# Patient Record
Sex: MALE | Race: WHITE | NOT HISPANIC OR LATINO | Employment: FULL TIME | URBAN - METROPOLITAN AREA
[De-identification: names, ages, dates, MRNs, and addresses within clinical notes are randomized per-mention and may not be internally consistent; named-entity substitution may affect disease eponyms.]

---

## 2021-07-26 ENCOUNTER — APPOINTMENT (EMERGENCY)
Dept: RADIOLOGY | Facility: HOSPITAL | Age: 21
End: 2021-07-26

## 2021-07-26 ENCOUNTER — HOSPITAL ENCOUNTER (EMERGENCY)
Facility: HOSPITAL | Age: 21
Discharge: HOME | End: 2021-07-26
Attending: EMERGENCY MEDICINE | Admitting: EMERGENCY MEDICINE

## 2021-07-26 VITALS
DIASTOLIC BLOOD PRESSURE: 76 MMHG | HEART RATE: 74 BPM | RESPIRATION RATE: 18 BRPM | OXYGEN SATURATION: 100 % | SYSTOLIC BLOOD PRESSURE: 132 MMHG | TEMPERATURE: 98.8 F

## 2021-07-26 DIAGNOSIS — S61.312A LACERATION OF RIGHT MIDDLE FINGER WITHOUT FOREIGN BODY WITH DAMAGE TO NAIL, INITIAL ENCOUNTER: Primary | ICD-10-CM

## 2021-07-26 PROCEDURE — 99284 EMERGENCY DEPT VISIT MOD MDM: CPT | Performed by: EMERGENCY MEDICINE

## 2021-07-26 PROCEDURE — 90471 IMMUNIZATION ADMIN: CPT

## 2021-07-26 PROCEDURE — 73140 X-RAY EXAM OF FINGER(S): CPT

## 2021-07-26 PROCEDURE — 99283 EMERGENCY DEPT VISIT LOW MDM: CPT

## 2021-07-26 PROCEDURE — 90715 TDAP VACCINE 7 YRS/> IM: CPT | Performed by: EMERGENCY MEDICINE

## 2021-07-26 PROCEDURE — 96365 THER/PROPH/DIAG IV INF INIT: CPT

## 2021-07-26 RX ORDER — CEFAZOLIN SODIUM 2 G/50ML
2000 SOLUTION INTRAVENOUS ONCE
Status: COMPLETED | OUTPATIENT
Start: 2021-07-26 | End: 2021-07-26

## 2021-07-26 RX ORDER — LIDOCAINE HYDROCHLORIDE 10 MG/ML
5 INJECTION, SOLUTION EPIDURAL; INFILTRATION; INTRACAUDAL; PERINEURAL ONCE
Status: COMPLETED | OUTPATIENT
Start: 2021-07-26 | End: 2021-07-26

## 2021-07-26 RX ORDER — OXYCODONE HYDROCHLORIDE AND ACETAMINOPHEN 5; 325 MG/1; MG/1
2 TABLET ORAL ONCE
Status: COMPLETED | OUTPATIENT
Start: 2021-07-26 | End: 2021-07-26

## 2021-07-26 RX ADMIN — OXYCODONE HYDROCHLORIDE AND ACETAMINOPHEN 2 TABLET: 5; 325 TABLET ORAL at 21:32

## 2021-07-26 RX ADMIN — TETANUS TOXOID, REDUCED DIPHTHERIA TOXOID AND ACELLULAR PERTUSSIS VACCINE, ADSORBED 0.5 ML: 5; 2.5; 8; 8; 2.5 SUSPENSION INTRAMUSCULAR at 21:33

## 2021-07-26 RX ADMIN — LIDOCAINE HYDROCHLORIDE 5 ML: 10 INJECTION, SOLUTION EPIDURAL; INFILTRATION; INTRACAUDAL at 21:06

## 2021-07-26 RX ADMIN — CEFAZOLIN SODIUM 2000 MG: 2 SOLUTION INTRAVENOUS at 21:08

## 2021-07-27 ENCOUNTER — TELEPHONE (OUTPATIENT)
Dept: OBGYN CLINIC | Facility: HOSPITAL | Age: 21
End: 2021-07-27

## 2021-07-27 ENCOUNTER — OFFICE VISIT (OUTPATIENT)
Dept: OBGYN CLINIC | Facility: CLINIC | Age: 21
End: 2021-07-27

## 2021-07-27 VITALS — BODY MASS INDEX: 21.22 KG/M2 | WEIGHT: 140 LBS | HEIGHT: 68 IN

## 2021-07-27 DIAGNOSIS — S61.209A FINGERTIP AVULSION, INITIAL ENCOUNTER: Primary | ICD-10-CM

## 2021-07-27 PROCEDURE — 99203 OFFICE O/P NEW LOW 30 MIN: CPT | Performed by: ORTHOPAEDIC SURGERY

## 2021-07-27 RX ORDER — CEPHALEXIN 500 MG/1
500 CAPSULE ORAL EVERY 6 HOURS SCHEDULED
Qty: 28 CAPSULE | Refills: 0 | Status: SHIPPED | OUTPATIENT
Start: 2021-07-27 | End: 2021-08-03

## 2021-07-27 RX ORDER — OXYCODONE HYDROCHLORIDE 5 MG/1
5 TABLET ORAL EVERY 4 HOURS PRN
Qty: 20 TABLET | Refills: 0 | Status: SHIPPED | OUTPATIENT
Start: 2021-07-27

## 2021-07-27 NOTE — TELEPHONE ENCOUNTER
Patient called because he was in the ED last night with a laceration of right middle finger  He was told by Dr Rae Jones from ED, that he needs to be seen today early in the morning no later than 8:30 AM since he only has a gauze and wound is open  I was able to schedule an appointment for 10:00 AM today but he does not want to wait until 10  Please advise    CB # [phone]

## 2021-07-27 NOTE — PROGRESS NOTES
Assessment/Plan:  1  Fingertip avulsion, initial encounter       Katie Szymanski is a pleasant 19year old who presents to the office today for an  initial evaluation of his right long finger laceration, date of injury 7/26/2021  Upon evaluation and review of his x-ray's, he has an oblique tissue avulsion of the ulnar aspect of his right long finger with a small amount of exposed bone  The small exposed bone was shortened in the office today without any complications  FDP and ext were intact post shortening  The wound was thoroughly cleansed with NSS> Sutures were placed in the office today without any complications  A sterile dressing was applied to the finger at today's visit  I discussed with the patient that the finger should heal over with good wound care  I discussed with the patient that because of the oblique avulsion, he might need the finger shortened in the future  A prescription for Keflex and Oxycodone was sent into the patient's pharmacy  I discussed with the patient that he should follow-up with orthopedics once he returns to [facility] for further evaluation  He understood and had no further questions  Subjective:   Patient ID: Ritu Mcgee is a 19 y o  male who presents to the office today for an initial evaluation of his right long finger laceration  He is currently visiting from [facility] and plans on returning to [facility] on 7/28/2021  He states that on 7/26/2021, he was mowing the grass and states that the grass got caught and he tried to unclog the mower when his finger was struck by the blade of the mower  He was seen in the ED on 7/26/2021 where x-ray's were taken and he was placed on an IV antibiotic  He has been taking per for the pain  He states that he is experiencing a throbbing pain about his right long finger  He denies any previous injuries  Review of Systems   Constitutional: Negative for chills, fever and unexpected weight change     HENT: Negative for hearing loss, nosebleeds and sore throat  Eyes: Negative for pain, redness and visual disturbance  Respiratory: Negative for cough, shortness of breath and wheezing  Cardiovascular: Negative for chest pain, palpitations and leg swelling  Gastrointestinal: Negative for abdominal pain, nausea and vomiting  Endocrine: Negative for polyphagia and polyuria  Genitourinary: Negative for dysuria and hematuria  Musculoskeletal: Negative for arthralgias, gait problem and joint swelling  Skin: Negative for rash and wound  Neurological: Negative for dizziness, numbness and headaches  Psychiatric/Behavioral: Negative for decreased concentration  The patient is not nervous/anxious  History reviewed  No pertinent past medical history  History reviewed  No pertinent surgical history  History reviewed  No pertinent family history  Social History     Occupational History    Not on file   Tobacco Use    Smoking status: Never Smoker    Smokeless tobacco: Never Used   Substance and Sexual Activity    Alcohol use: Never    Drug use: Never    Sexual activity: Not on file       No current outpatient medications on file  No current facility-administered medications for this visit  No Known Allergies    Objective: There were no vitals filed for this visit  Ortho Exam   Right Long Finger  Oblique tissue avulsion of ulnar aspect  Small exposed bone  FDS and FDP extensors intact  Compartments soft  Brisk capillary refill  S/m intact median, radial, and ulnar nerve     Physical Exam  Vitals reviewed  Constitutional:       Appearance: He is well-developed  HENT:      Head: Normocephalic and atraumatic  Eyes:      Conjunctiva/sclera: Conjunctivae normal       Pupils: Pupils are equal, round, and reactive to light  Cardiovascular:      Rate and Rhythm: Normal rate  Pulmonary:      Effort: Pulmonary effort is normal  No respiratory distress  Musculoskeletal:      Cervical back: Normal range of motion and neck supple  Comments: As noted in HPI   Skin:     General: Skin is warm and dry  Neurological:      Mental Status: He is alert and oriented to person, place, and time  Psychiatric:         Behavior: Behavior normal          I have personally reviewed pertinent films in PACS and my interpretation is as follows: X-ray's performed on 7/26/2021 of his right hand demonstrates an oblique tissue avulsion of the ulnar aspect of his right long finger

## 2021-07-27 NOTE — ED PROVIDER NOTES
History  Chief Complaint   Patient presents with    Finger Laceration     Cutting the grass this afternoon and the  cut the right middle finger     Patient is a 68-year-old male that was cutting the grass with long more in the grass got caught in the more so he put his hand into trouble the grass out and the  blade spun around amputating the distal portion of his middle phalanx on his right hand  No other injuries          None       History reviewed  No pertinent past medical history  History reviewed  No pertinent surgical history  History reviewed  No pertinent family history  I have reviewed and agree with the history as documented  E-Cigarette/Vaping     E-Cigarette/Vaping Substances     Social History     Tobacco Use    Smoking status: Never Smoker    Smokeless tobacco: Never Used   Substance Use Topics    Alcohol use: Never    Drug use: Never       Review of Systems   Constitutional: Negative for activity change, chills, diaphoresis and fever  HENT: Negative for congestion, ear pain, nosebleeds, sore throat, trouble swallowing and voice change  Eyes: Negative for pain, discharge and redness  Respiratory: Negative for apnea, cough, choking, shortness of breath, wheezing and stridor  Cardiovascular: Negative for chest pain and palpitations  Gastrointestinal: Negative for abdominal distention, abdominal pain, constipation, diarrhea, nausea and vomiting  Endocrine: Negative for polydipsia  Genitourinary: Negative for difficulty urinating, dysuria, flank pain, frequency, hematuria and urgency  Musculoskeletal: Negative for back pain, gait problem, joint swelling, myalgias, neck pain and neck stiffness  Skin: Negative for pallor and rash  Neurological: Negative for dizziness, tremors, syncope, speech difficulty, weakness, numbness and headaches  Hematological: Negative for adenopathy     Psychiatric/Behavioral: Negative for confusion, hallucinations, self-injury and suicidal ideas  The patient is not nervous/anxious  Physical Exam  Physical Exam  Vitals and nursing note reviewed  Constitutional:       General: He is not in acute distress  Appearance: He is well-developed  He is not diaphoretic  HENT:      Head: Normocephalic and atraumatic  Right Ear: External ear normal       Left Ear: External ear normal       Nose: Nose normal    Eyes:      Conjunctiva/sclera: Conjunctivae normal       Pupils: Pupils are equal, round, and reactive to light  Cardiovascular:      Rate and Rhythm: Normal rate and regular rhythm  Heart sounds: Normal heart sounds  Pulmonary:      Effort: Pulmonary effort is normal       Breath sounds: Normal breath sounds  Abdominal:      General: Bowel sounds are normal       Palpations: Abdomen is soft  Musculoskeletal:         General: Normal range of motion  Cervical back: Normal range of motion and neck supple  Comments: Patient has lost the distal tip of the right middle finger angulated across the nail which is completely missing  Patient does also have bone exposed and fracture of the distal tip  Skin:     General: Skin is warm and dry  Neurological:      Mental Status: He is alert and oriented to person, place, and time  Deep Tendon Reflexes: Reflexes are normal and symmetric  Psychiatric:         Behavior: Behavior is cooperative           Vital Signs  ED Triage Vitals   Temperature Pulse Respirations Blood Pressure SpO2   07/26/21 1937 07/26/21 1940 07/26/21 1937 07/26/21 1937 07/26/21 1937   98 8 °F (37 1 °C) 84 20 161/85 100 %      Temp Source Heart Rate Source Patient Position - Orthostatic VS BP Location FiO2 (%)   07/26/21 1937 -- 07/26/21 1937 07/26/21 1937 --   Temporal  Lying Left arm       Pain Score       07/26/21 1937       Worst Possible Pain           Vitals:    07/26/21 1937 07/26/21 1940 07/26/21 2140   BP: 161/85  132/76   Pulse:  84 74   Patient Position - Orthostatic VS: Lying           Visual Acuity      ED Medications  Medications   ceFAZolin (ANCEF) IVPB (premix in dextrose) 2,000 mg 50 mL (0 mg Intravenous Stopped 7/26/21 2138)   lidocaine (PF) (XYLOCAINE-MPF) 1 % injection 5 mL (5 mL Infiltration Given 7/26/21 2106)   tetanus-diphtheria-acellular pertussis (BOOSTRIX) IM injection 0 5 mL (0 5 mL Intramuscular Given 7/26/21 2133)   oxyCODONE-acetaminophen (PERCOCET) 5-325 mg per tablet 2 tablet (2 tablets Oral Given 7/26/21 2132)       Diagnostic Studies  Results Reviewed     None                 XR finger third digit - middle RIGHT    (Results Pending)              Procedures  Procedures         ED Course                                           MDM  Number of Diagnoses or Management Options  Laceration of right middle finger without foreign body with damage to nail, initial encounter  Diagnosis management comments: I discussed the case and sent pictures over tiger text to hand surgeon on-call who advises to wash it out give IV antibiotics and they will follow up in the office tomorrow  Disposition  Final diagnoses:   Laceration of right middle finger without foreign body with damage to nail, initial encounter     Time reflects when diagnosis was documented in both MDM as applicable and the Disposition within this note     Time User Action Codes Description Comment    7/26/2021  9:26 PM Tanvi Burnham Add [M54 205T] Laceration of right middle finger without foreign body with damage to nail, initial encounter       ED Disposition     ED Disposition Condition Date/Time Comment    Discharge Stable Mon Jul 26, 2021  9:26 PM Ralph Santana discharge to home/self care              Follow-up Information     Follow up With Specialties Details Why Contact Elder Ford DO Orthopedic Surgery, Hand Surgery Schedule an appointment as soon as possible for a visit in 1 day  [address] [facility] [address], [address] [facility]  [address]

## 2021-07-27 NOTE — TELEPHONE ENCOUNTER
Patient called in wanting to make an appointment in which the phone broke up/disconnected while trying to gather information to set up an appointment

## 2021-07-27 NOTE — TELEPHONE ENCOUNTER
Spoke to Varinder Souza who spoke to Dr Sai Cardenas spoke to Dr Vic Catherine and he knows the situation  I spoke to patient and he verbalized understanding and will be in at 10 AM in [facility] today

## 2021-07-27 NOTE — ED NOTES
X-ray at bedside     Sarah Alert, [address] [facility]  07/26/21 2005

## 2021-07-28 ENCOUNTER — HOSPITAL ENCOUNTER (EMERGENCY)
Facility: HOSPITAL | Age: 21
Discharge: HOME | End: 2021-07-28
Attending: EMERGENCY MEDICINE | Admitting: EMERGENCY MEDICINE

## 2021-07-28 VITALS
TEMPERATURE: 96.9 F | RESPIRATION RATE: 19 BRPM | HEART RATE: 78 BPM | OXYGEN SATURATION: 100 % | DIASTOLIC BLOOD PRESSURE: 83 MMHG | SYSTOLIC BLOOD PRESSURE: 145 MMHG

## 2021-07-28 DIAGNOSIS — S62.609A FINGER FRACTURE: ICD-10-CM

## 2021-07-28 DIAGNOSIS — Z51.89 VISIT FOR WOUND CHECK: Primary | ICD-10-CM

## 2021-07-28 DIAGNOSIS — S61.209A AVULSION OF FINGER TIP, INITIAL ENCOUNTER: ICD-10-CM

## 2021-07-28 PROCEDURE — 99282 EMERGENCY DEPT VISIT SF MDM: CPT

## 2021-07-28 PROCEDURE — 64450 NJX AA&/STRD OTHER PN/BRANCH: CPT | Performed by: PHYSICIAN ASSISTANT

## 2021-07-28 PROCEDURE — 99284 EMERGENCY DEPT VISIT MOD MDM: CPT | Performed by: PHYSICIAN ASSISTANT

## 2021-07-28 RX ORDER — LIDOCAINE HYDROCHLORIDE 10 MG/ML
10 INJECTION, SOLUTION EPIDURAL; INFILTRATION; INTRACAUDAL; PERINEURAL ONCE
Status: COMPLETED | OUTPATIENT
Start: 2021-07-28 | End: 2021-07-28

## 2021-07-28 RX ORDER — BUPIVACAINE HYDROCHLORIDE 2.5 MG/ML
10 INJECTION, SOLUTION EPIDURAL; INFILTRATION; INTRACAUDAL ONCE
Status: COMPLETED | OUTPATIENT
Start: 2021-07-28 | End: 2021-07-28

## 2021-07-28 RX ADMIN — BUPIVACAINE HYDROCHLORIDE 10 ML: 2.5 INJECTION, SOLUTION EPIDURAL; INFILTRATION; INTRACAUDAL; PERINEURAL at 10:22

## 2021-07-28 RX ADMIN — LIDOCAINE HYDROCHLORIDE 10 ML: 10 INJECTION, SOLUTION EPIDURAL; INFILTRATION; INTRACAUDAL; PERINEURAL at 10:22

## 2021-07-28 NOTE — DISCHARGE INSTRUCTIONS
You have a right middle finger fracture with skin avulsion injury    Please keep finger elevated above heart throughout the day  Keep dressing clean and dry  Avoid any NSAIDs such as Advil, Aleve, Motrin, naproxen, ibuprofen, etc until seen by hand surgery  Avoid aspirin until seen by hand surgery  Complete antibiotics as prescribed  Take oxycodone as prescribed, can supplement with Tylenol 650mg as needed

## 2021-07-28 NOTE — ED PROVIDER NOTES
History  Chief Complaint   Patient presents with    Wound Check     patient states he was seen here a couple days ago for a laceration to the right hand - saw Dr Nixon Cheek yesterday and had some sutures put in   states it is bleeding through the dressing  did not contact Dr Nixon Cheek today per patient  68-year-old male presents for wound check  He sustained a finger tip avulsion fracture injury 2 days ago, was seen by hand surgery yesterday, and had his bone shortened along with the skin sutured over top  He is originally from [facility] and is supposed to return to [facility] today  He is due to follow with Orthopedics in [facility]  He presents to the ER today because he said that he has bleeding that soaked through the dressing after sleeping  He has not removed the dressing  He did not call his hand surgeon regarding this today  No fever, chills, numbness, tingling, swelling, bruising  No other injuries or complaints  Prior to Admission Medications   Prescriptions Last Dose Informant Patient Reported? Taking? cephalexin (KEFLEX) 500 mg capsule   No Yes   Sig: Take 1 capsule (500 mg total) by mouth every 6 (six) hours for 7 days   oxyCODONE (ROXICODONE) 5 mg immediate release tablet   No Yes   Sig: Take 1 tablet (5 mg total) by mouth every 4 (four) hours as needed for moderate painMax Daily Amount: 30 mg      Facility-Administered Medications: None       History reviewed  No pertinent past medical history  History reviewed  No pertinent surgical history  History reviewed  No pertinent family history  I have reviewed and agree with the history as documented  E-Cigarette/Vaping     E-Cigarette/Vaping Substances     Social History     Tobacco Use    Smoking status: Never Smoker    Smokeless tobacco: Never Used   Substance Use Topics    Alcohol use: Never    Drug use: Never       Review of Systems   Constitutional: Negative for chills and fever     HENT: Negative for sneezing and sore throat  Respiratory: Negative for cough and shortness of breath  Cardiovascular: Negative for chest pain, palpitations and leg swelling  Gastrointestinal: Negative for abdominal pain, constipation, diarrhea, nausea and vomiting  Musculoskeletal: Negative for back pain, gait problem, joint swelling and myalgias  Skin: Positive for wound  Negative for color change, pallor and rash  Neurological: Negative for dizziness, syncope, weakness, light-headedness, numbness and headaches  All other systems reviewed and are negative  Physical Exam  Physical Exam  Vitals and nursing note reviewed  Constitutional:       General: He is not in acute distress  Appearance: Normal appearance  He is well-developed and normal weight  He is not diaphoretic  HENT:      Head: Normocephalic and atraumatic  Nose: Nose normal    Eyes:      Extraocular Movements: Extraocular movements intact  Conjunctiva/sclera: Conjunctivae normal    Cardiovascular:      Rate and Rhythm: Normal rate and regular rhythm  Pulses: Normal pulses  Heart sounds: Normal heart sounds  No murmur heard  No friction rub  No gallop  Pulmonary:      Effort: Pulmonary effort is normal  No respiratory distress  Breath sounds: Normal breath sounds  No stridor  No wheezing or rales  Comments: Sp02 is 100% indicating adequate oxygenation on room air  Musculoskeletal:         General: Normal range of motion  Hands:       Cervical back: Normal range of motion and neck supple  Skin:     General: Skin is warm and dry  Capillary Refill: Capillary refill takes less than 2 seconds  Coloration: Skin is not pale  Findings: No erythema or rash  Neurological:      Mental Status: He is alert  Mental status is at baseline           Vital Signs  ED Triage Vitals [07/28/21 0937]   Temperature Pulse Respirations Blood Pressure SpO2   (!) 96 9 °F (36 1 °C) 78 19 145/83 100 %      Temp Source Heart Rate Source Patient Position - Orthostatic VS BP Location FiO2 (%)   Tympanic Monitor Sitting Left arm --      Pain Score       5           Vitals:    07/28/21 0937   BP: 145/83   Pulse: 78   Patient Position - Orthostatic VS: Sitting         Visual Acuity      ED Medications  Medications   bupivacaine (PF) (MARCAINE) 0 25 % injection 10 mL (10 mL Infiltration Given 7/28/21 1022)   lidocaine (PF) (XYLOCAINE-MPF) 1 % injection 10 mL (10 mL Infiltration Given 7/28/21 1022)       Diagnostic Studies  Results Reviewed     None                 No orders to display              Procedures  Digital Block  Performed by: Win Ivan PA-C  Authorized by: Win Ivan PA-C     Procedure date/time:  7/28/2021 10:41 AM  Consent:     Consent obtained:  Verbal    Consent given by:  Patient    Risks discussed: Allergic reaction, bleeding, intravascular injection, infection, pain, nerve damage, swelling and unsuccessful block    Alternatives discussed:  No treatment, delayed treatment, alternative treatment and referral  Indications:     Indications:  Pain relief  Location:     Block location:  Finger    Finger blocked:  R long finger  Pre-procedure details:     Neurovascular status: intact      Skin preparation:  Povidone-iodine  Procedure details (see MAR for exact dosages):     Needle gauge:  25 G    Anesthetic injected:  Lidocaine 1% w/o epi and bupivacaine 0 25% w/o epi    Technique: two sided block  Injection procedure:  Anatomic landmarks identified  Post-procedure details:     Outcome:  Anesthesia achieved    Patient tolerance of procedure: Tolerated well, no immediate complications             ED Course  ED Course as of Jul 28 1140   Wed Jul 28, 2021   1006 Discussed case with Dr Herbert Cunningham who advised to change dressing today and use gel foam for bleeding, advised patient to keep hand elevated, avoid NSAIDs and aspirin, and have urgent follow up with a hand surgeon in [facility] as patient is going back there today   Complete abx as prescribed  Keep dressing dry and clean  [facility]  Number of Diagnoses or Management Options  Diagnosis management comments: Discussed management with Dr Quin Maynard hand surgeon who saw patient yesterday  Advised to change dressing and use Gelfoam for slow bleed then cover  Keep dressing clean and dry  Advised to elevate hand throughout day, avoid any NSAIDs/aspirin  Patient to see hand surgeon in [facility] tomorrow for re-eval and management  Continue oxycodone and tylenol for pain, complete course of keflex as prescribed  Gave patient proper education regarding diagnosis  Answered all questions  Return to ED for any worsening of symptoms otherwise follow up with primary care physician for re-evaluation  Discussed plan with patient who verbalized understanding and agreed to plan  Amount and/or Complexity of Data Reviewed  Review and summarize past medical records: yes  Discuss the patient with other providers: yes (Dr Quin Maynard)        Disposition  Final diagnoses:   Visit for wound check   Avulsion of finger tip, initial encounter   Finger fracture     Time reflects when diagnosis was documented in both MDM as applicable and the Disposition within this note     Time User Action Codes Description Comment    7/28/2021 11:13 AM Ryann Tello Add [Z51 89] Visit for wound check     7/28/2021 11:13 AM Ryann Tello Add [S61 209A] Avulsion of finger tip, initial encounter     7/28/2021 11:13 AM Nikki Coats [address] Finger fracture       ED Disposition     ED Disposition Condition Date/Time Comment    Discharge Stable Wed Jul 28, 2021 11:13 AM Huang Tello discharge to home/self care              Follow-up Information     Follow up With Specialties Details Why Contact Info Additional Information    Hand surgeon  Go in 1 day For follow-up finger tip avulsion injury/open finger fracture      [address] [facility] Emergency [facility] Emergency Medicine Go to  As needed [address] [facility] [address]  [address] [address] Emergency Department, [place], [place], [address]          Discharge Medication List as of 7/28/2021 11:20 AM      CONTINUE these medications which have NOT CHANGED    Details   cephalexin (KEFLEX) 500 mg capsule Take 1 capsule (500 mg total) by mouth every 6 (six) hours for 7 days, Starting Tue 7/27/2021, Until Tue 8/3/2021, Normal      oxyCODONE (ROXICODONE) 5 mg immediate release tablet Take 1 tablet (5 mg total) by mouth every 4 (four) hours as needed for moderate painMax Daily Amount: 30 mg, Starting Tue 7/27/2021, Normal           No discharge procedures on file      PDMP Review     None          ED Provider  Electronically Signed by           Gracie Acosta PA-C  07/28/21 2739